# Patient Record
Sex: MALE | Race: ASIAN | Employment: OTHER | URBAN - METROPOLITAN AREA
[De-identification: names, ages, dates, MRNs, and addresses within clinical notes are randomized per-mention and may not be internally consistent; named-entity substitution may affect disease eponyms.]

---

## 2024-11-04 ENCOUNTER — APPOINTMENT (OUTPATIENT)
Dept: GENERAL RADIOLOGY | Facility: HOSPITAL | Age: 59
End: 2024-11-04
Attending: STUDENT IN AN ORGANIZED HEALTH CARE EDUCATION/TRAINING PROGRAM

## 2024-11-04 ENCOUNTER — HOSPITAL ENCOUNTER (EMERGENCY)
Facility: HOSPITAL | Age: 59
Discharge: STILL ADMITTED OR ADMINISTRATIVE | End: 2024-11-04

## 2024-11-04 ENCOUNTER — HOSPITAL ENCOUNTER (EMERGENCY)
Facility: HOSPITAL | Age: 59
Discharge: HOME | End: 2024-11-04
Attending: STUDENT IN AN ORGANIZED HEALTH CARE EDUCATION/TRAINING PROGRAM

## 2024-11-04 VITALS
OXYGEN SATURATION: 96 % | RESPIRATION RATE: 18 BRPM | TEMPERATURE: 98 F | SYSTOLIC BLOOD PRESSURE: 125 MMHG | DIASTOLIC BLOOD PRESSURE: 80 MMHG | HEART RATE: 66 BPM | WEIGHT: 154.31 LBS

## 2024-11-04 DIAGNOSIS — S22.41XA CLOSED FRACTURE OF MULTIPLE RIBS OF RIGHT SIDE, INITIAL ENCOUNTER: Primary | ICD-10-CM

## 2024-11-04 PROCEDURE — 71101 X-RAY EXAM UNILAT RIBS/CHEST: CPT | Performed by: STUDENT IN AN ORGANIZED HEALTH CARE EDUCATION/TRAINING PROGRAM

## 2024-11-04 PROCEDURE — 99283 EMERGENCY DEPT VISIT LOW MDM: CPT

## 2024-11-04 PROCEDURE — 99284 EMERGENCY DEPT VISIT MOD MDM: CPT

## 2024-11-04 RX ORDER — HYDROCODONE BITARTRATE AND ACETAMINOPHEN 5; 325 MG/1; MG/1
1-2 TABLET ORAL EVERY 6 HOURS PRN
Qty: 10 TABLET | Refills: 0 | Status: SHIPPED | OUTPATIENT
Start: 2024-11-04 | End: 2024-11-09

## 2024-11-04 RX ORDER — LIDOCAINE 50 MG/G
1 PATCH TOPICAL EVERY 24 HOURS
Qty: 7 PATCH | Refills: 0 | Status: SHIPPED | OUTPATIENT
Start: 2024-11-04 | End: 2024-11-11

## 2024-11-04 RX ORDER — HYDROCODONE BITARTRATE AND ACETAMINOPHEN 5; 325 MG/1; MG/1
1 TABLET ORAL ONCE
Status: COMPLETED | OUTPATIENT
Start: 2024-11-04 | End: 2024-11-04

## 2024-11-04 NOTE — DISCHARGE INSTRUCTIONS
Follow-up  You are likely to heal in 6 to 8 weeks. Most rib fractures heal on their own with no lasting effects. Call your healthcare provider right away if you notice any of these symptoms:   Increased chest pain  Shortness of breath  Fever of 100.4°F (38°C) or above, or as directed by your provider  Chills  Coughing up blood  Ken last reviewed this educatio

## 2024-11-04 NOTE — ED INITIAL ASSESSMENT (HPI)
Pt c/o of R side rib pain after falling from a 5ft ladder yesterday. Pt states he fell  from ladder but landed on the ladder on his R side. Pt states he did hit his head. Denies LOC. Denies n/v. Pt went to IC and xray showed broken ribs.

## 2024-11-05 NOTE — ED PROVIDER NOTES
Patient Seen in: [facility] Emergency Department      History     Chief Complaint   Patient presents with    Trauma     Stated Complaint: broken ribs    Subjective:   HPI      59-year-old male history of diabetes presenting for evaluation of abnormal outpatient imaging.  He was seen at urgent care today for a fall he sustained yesterday.  He was diagnosed with broken ribs and he was sent to the ER because family was told that they could not exclude pneumothorax based on the quality of the chest x-ray.  Patient's family is at the bedside who prefers to translate for patient.  Patient fell 5 feet off of a ladder yesterday.  He landed on his right side.  He hit his head but did not lose consciousness.  He denies headache nausea vomiting or visual changes.  He denies difficulty breathing.  It is painful to take a deep breath mostly on the right side of his chest wall. No blood thinner use.     Objective:     Past Medical History:    Diabetes (HCC)              History reviewed. No pertinent surgical history.             Social History     Socioeconomic History    Marital status:    Tobacco Use    Smoking status: Every Day     Types: Cigarettes   Substance and Sexual Activity    Alcohol use: Yes    Drug use: Never                  Physical Exam     ED Triage Vitals [11/04/24 1112]   /86   Pulse 91   Resp 18   Temp 98.1 °F (36.7 °C)   Temp src Temporal   SpO2 95 %   O2 Device None (Room air)       Current Vitals:   Vital Signs  BP: 125/80  Pulse: 66  Resp: 18  Temp: 98.1 °F (36.7 °C)  Temp src: Temporal  MAP (mmHg): 94    Oxygen Therapy  SpO2: 96 %  O2 Device: None (Room air)        Physical Exam  Constitutional: awake, alert, no sig distress  HENT: mmm, no lesions,  Neck: normal range of motion, no tenderness, supple.  Eyes: PERRL, EOMI, conjunctiva normal, no discharge. Sclera anicteric.  Cardiovascular: rr no murmur  Respiratory: Normal breath sounds, no respiratory distress, tenderness to  palpation along right lateral chest wall no ecchymosis no flail chest  GI: Bowel sounds normal, Soft, no tenderness, no masses, no pulsatile masses.  : No CVA tenderness.  Skin: Warm, dry, no erythema, no rash.  Musculoskeletal: Intact distal pulses, no edema, no tenderness, no cyanosis, no clubbing. Good range of motion in all major joints. No tenderness to palpation or major deformities noted. Back- No tenderness.  Neurologic: Alert & oriented x 3, normal motor function, normal sensory function, no focal deficits noted.  Psych: Calm, cooperative, nl affect        ED Course   Labs Reviewed - No data to display                MDM      59-year-old male with history as documented above presenting for evaluation of right lateral chest wall pain after a fall from 5 feet.  On arrival vitals are stable and reassuring.  DDx includes rib fractures, pneumothorax, hemothorax, atelectasis  -Given Norco and lidocaine patches for pain  With regard to patient's head injury, he is not on blood thinners he is 24 hours remote from head injury with normal neurologic examination no headache no nausea vomiting or other signs of elevated ICP    I reviewed patient's x-ray and rib films independently which is remarkable for multiple rib fractures 5-9, these are 1 segment fractures there is no evidence of flail chest.  There is no hemothorax or pneumothorax    Given incentive spirometer and had RT to bedside to teach patient.  Will discharge on pain medications close PMD follow-up.  Return precautions and follow-up instructions were discussed with patient who voiced understanding and agreement the plan.  All questions were answered to patient satisfaction.        Medical Decision Making      Disposition and Plan     Clinical Impression:  1. Closed fracture of multiple ribs of right side, initial encounter         Disposition:  Discharge  11/4/2024  3:05 pm    Follow-up:  [facility] Emergency Department  [address]  [place] [address]  [phone]  Follow up  If symptoms worsen, As needed    Ramon Davies MD  [address]  [facility] [address]  [phone]    Follow up            Medications Prescribed:  Discharge Medication List as of 11/4/2024  3:06 PM        START taking these medications    Details   lidocaine 5 % External Patch Place 1 patch onto the skin daily for 7 days., Print, Disp-7 patch, R-0      HYDROcodone-acetaminophen 5-325 MG Oral Tab Take 1-2 tablets by mouth every 6 (six) hours as needed for Pain., Print, Disp-10 tablet, R-0                 Supplementary Documentation: